# Patient Record
Sex: MALE | Race: WHITE | NOT HISPANIC OR LATINO | URBAN - METROPOLITAN AREA
[De-identification: names, ages, dates, MRNs, and addresses within clinical notes are randomized per-mention and may not be internally consistent; named-entity substitution may affect disease eponyms.]

---

## 2023-12-15 ENCOUNTER — EMERGENCY (EMERGENCY)
Facility: HOSPITAL | Age: 28
LOS: 1 days | Discharge: HOME | End: 2023-12-15
Attending: EMERGENCY MEDICINE | Admitting: EMERGENCY MEDICINE
Payer: SELF-PAY

## 2023-12-15 VITALS
SYSTOLIC BLOOD PRESSURE: 130 MMHG | HEART RATE: 80 BPM | DIASTOLIC BLOOD PRESSURE: 88 MMHG | OXYGEN SATURATION: 97 % | TEMPERATURE: 99 F | RESPIRATION RATE: 16 BRPM | WEIGHT: 215.61 LBS

## 2023-12-15 DIAGNOSIS — R21 RASH AND OTHER NONSPECIFIC SKIN ERUPTION: ICD-10-CM

## 2023-12-15 DIAGNOSIS — L08.9 LOCAL INFECTION OF THE SKIN AND SUBCUTANEOUS TISSUE, UNSPECIFIED: ICD-10-CM

## 2023-12-15 PROCEDURE — 99053 MED SERV 10PM-8AM 24 HR FAC: CPT

## 2023-12-15 PROCEDURE — 99283 EMERGENCY DEPT VISIT LOW MDM: CPT

## 2023-12-15 RX ORDER — MUPIROCIN 20 MG/G
1 OINTMENT TOPICAL
Qty: 1 | Refills: 0
Start: 2023-12-15 | End: 2023-12-21

## 2023-12-15 NOTE — ED PROVIDER NOTE - ATTENDING CONTRIBUTION TO CARE
29 yo M with no PMH Presents to the ED for rash to the back of his head which occurred after [place] went to [place] and [place] [facility]. PT went to the pharmacy today and they started him on hydrocortisone and neosporin but he states that the rash has become more painful.     Const: NAD  Eyes: PERRL, no conjunctival injection  HENT:  Neck supple without meningismus   Skin: rased circular vesicular rash to head. not in dermatomal pattern.     concern for infection. Will give mupirocin cream and strict return precautions 27 yo M with no PMH Presents to the ED for rash to the back of his head which occurred after he went to the [facility] and had his head shaved. PT went to the pharmacy today and they started him on hydrocortisone and neosporin but he states that the rash has become more painful.     Const: NAD  Eyes: PERRL, no conjunctival injection  HENT:  Neck supple without meningismus   Skin: rased circular vesicular rash to head. not in dermatomal pattern.     concern for infection. Will give mupirocin cream and strict return precautions

## 2023-12-15 NOTE — ED PROVIDER NOTE - NSFOLLOWUPINSTRUCTIONS_ED_ALL_ED_FT
Today you were seen in the ED for an area of concern to the back of your head.     Please apply mupirocin to the back of your head 2 times a day    Please follow-up with dermatologist 1 will call you to make an appointment.    It was found that you have No signs of medical emergency warranting further evaluation in the emergency department.    A copy of your results attached this document below.    Please follow up with Your primary care provider in regards to today's event.    Please return to the ED if you have any of the following:   Severe drainage if the redness increases in size you develop a fever Today you were seen in the ED for an area of concern to the back of your head.     Please apply mupirocin to the back of your head 2 times a day    Please follow-up with dermatologist 1 will call you to make an appointment.    It was found that you have No signs of medical emergency warranting further evaluation in the emergency department.    A copy of your results attached this document below.    Please follow up with Your primary care provider in regards to today's event.  Also you can visit with a dermatologist, information for such can be found below.     [facility] at [address] [address]  Phone: [phone]        Please return to the ED if you have any of the following:   Severe drainage if the redness increases in size you develop a fever Today you were seen in the ED for an area of concern to the back of your head.     Please apply mupirocin to the back of your head 2 times a day    Please follow-up with dermatologist 1 will call you to make an appointment.    It was found that you have No signs of medical emergency warranting further evaluation in the emergency department.    A copy of your results attached this document below.    Please follow up with Your primary care provider in regards to today's event.  Also you can visit with a dermatologist, information for such can be found below.     [facility] at [address] [address]  Phone: [phone]        Please return to the ED if you have any of the following:   Severe drainage if the redness increases in size you develop a fever

## 2023-12-15 NOTE — ED PROVIDER NOTE - PATIENT PORTAL LINK FT
You can access the FollowMyHealth Patient Portal offered by [facility] by registering at the following website: [email or web address] By joining [employer]’s FollowMyHealth portal, you will also be able to view your health information using other applications (apps) compatible with our system. You can access the FollowMyHealth Patient Portal offered by [facility] by registering at the following website: [email or web address]. By joining [employer]’s FollowMyHealth portal, you will also be able to view your health information using other applications (apps) compatible with our system.

## 2023-12-15 NOTE — ED PROVIDER NOTE - PHYSICAL EXAMINATION
GENERAL: Awake, alert, NAD  HEENT: small erythema to back of scalp w/o drainage   BACK: No midline spinal tenderness, no CVA tenderness  NEURO: A&Ox3. Moving all extremities.  SKIN: Warm and dry. No rash.  PSYCH: Normal affect. GENERAL: Awake, alert, NAD  HEENT: small erythema to back of scalp w/o drainage  no fluctuance noted along the right side   BACK: No midline spinal tenderness, no CVA tenderness  NEURO: A&Ox3. Moving all extremities.  SKIN: small erythema to back of scalp w/o drainage  no fluctuance noted along the right side   PSYCH: Normal affect.